# Patient Record
Sex: MALE | Race: WHITE
[De-identification: names, ages, dates, MRNs, and addresses within clinical notes are randomized per-mention and may not be internally consistent; named-entity substitution may affect disease eponyms.]

---

## 2017-08-21 ENCOUNTER — HOSPITAL ENCOUNTER (EMERGENCY)
Dept: HOSPITAL 12 - ER | Age: 82
Discharge: HOME | End: 2017-08-21
Payer: MEDICARE

## 2017-08-21 VITALS — DIASTOLIC BLOOD PRESSURE: 80 MMHG | SYSTOLIC BLOOD PRESSURE: 143 MMHG

## 2017-08-21 VITALS — BODY MASS INDEX: 27.48 KG/M2 | HEIGHT: 60 IN | WEIGHT: 140 LBS

## 2017-08-21 DIAGNOSIS — Z79.82: ICD-10-CM

## 2017-08-21 DIAGNOSIS — E78.5: ICD-10-CM
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
## 2017-08-21 DIAGNOSIS — F17.200: ICD-10-CM
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
## 2017-08-21 DIAGNOSIS — I10: ICD-10-CM

## 2017-08-21 DIAGNOSIS — S49.92XA: Primary | ICD-10-CM
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
## 2017-08-21 DIAGNOSIS — Z86.73: ICD-10-CM

## 2017-08-21 DIAGNOSIS — K21.9: ICD-10-CM

## 2017-08-21 DIAGNOSIS — I25.2: ICD-10-CM

## 2017-08-21 PROCEDURE — A4663 DIALYSIS BLOOD PRESSURE CUFF: HCPCS

## 2017-09-06 ENCOUNTER — HOSPITAL ENCOUNTER (OUTPATIENT)
Dept: HOSPITAL 12 - RAD | Age: 82
Discharge: HOME | End: 2017-09-06
Payer: MEDICARE

## 2017-09-06 DIAGNOSIS — I70.0: ICD-10-CM

## 2017-09-06 DIAGNOSIS — J98.11: Primary | ICD-10-CM

## 2017-11-14 ENCOUNTER — HOSPITAL ENCOUNTER (EMERGENCY)
Dept: HOSPITAL 12 - ER | Age: 82
Discharge: HOME | End: 2017-11-14
Payer: MEDICARE

## 2017-11-14 VITALS — DIASTOLIC BLOOD PRESSURE: 71 MMHG | SYSTOLIC BLOOD PRESSURE: 123 MMHG

## 2017-11-14 VITALS — HEIGHT: 64 IN | WEIGHT: 140 LBS | BODY MASS INDEX: 23.9 KG/M2

## 2017-11-14 DIAGNOSIS — Z95.5: ICD-10-CM
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
## 2017-11-14 DIAGNOSIS — K21.9: ICD-10-CM
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
## 2017-11-14 DIAGNOSIS — E78.5: ICD-10-CM

## 2017-11-14 DIAGNOSIS — S80.02XA: Primary | ICD-10-CM
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
## 2017-11-14 DIAGNOSIS — Z86.73: ICD-10-CM

## 2017-11-14 DIAGNOSIS — F17.200: ICD-10-CM

## 2017-11-14 DIAGNOSIS — I10: ICD-10-CM

## 2017-11-14 PROCEDURE — A4663 DIALYSIS BLOOD PRESSURE CUFF: HCPCS

## 2017-11-14 PROCEDURE — 73564 X-RAY EXAM KNEE 4 OR MORE: CPT

## 2017-11-14 PROCEDURE — 99284 EMERGENCY DEPT VISIT MOD MDM: CPT

## 2017-11-14 PROCEDURE — 29505 APPLICATION LONG LEG SPLINT: CPT

## 2017-11-14 NOTE — NUR
Patient discharged to home in stable conditon.  Written and verbal after care 
instructions given. 

Patient verbalizes understanding of instructions.pt using the walker easily, pt 
accompanied by daughter. pt says feels better.

## 2017-11-23 ENCOUNTER — HOSPITAL ENCOUNTER (EMERGENCY)
Dept: HOSPITAL 12 - ER | Age: 82
Discharge: HOME | End: 2017-11-23
Payer: MEDICARE

## 2017-11-23 VITALS — SYSTOLIC BLOOD PRESSURE: 144 MMHG | DIASTOLIC BLOOD PRESSURE: 79 MMHG

## 2017-11-23 VITALS — WEIGHT: 130 LBS | BODY MASS INDEX: 23.92 KG/M2 | HEIGHT: 62 IN

## 2017-11-23 DIAGNOSIS — Z79.02: ICD-10-CM
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
## 2017-11-23 DIAGNOSIS — E78.5: ICD-10-CM

## 2017-11-23 DIAGNOSIS — Z79.82: ICD-10-CM
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
## 2017-11-23 DIAGNOSIS — Z95.5: ICD-10-CM

## 2017-11-23 DIAGNOSIS — I10: ICD-10-CM

## 2017-11-23 DIAGNOSIS — Z86.73: ICD-10-CM

## 2017-11-23 DIAGNOSIS — K21.9: ICD-10-CM

## 2017-11-23 DIAGNOSIS — M25.462: Primary | ICD-10-CM
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
## 2017-11-23 DIAGNOSIS — F17.200: ICD-10-CM

## 2017-11-23 PROCEDURE — 73590 X-RAY EXAM OF LOWER LEG: CPT

## 2017-11-23 PROCEDURE — 99284 EMERGENCY DEPT VISIT MOD MDM: CPT

## 2017-11-23 PROCEDURE — 73564 X-RAY EXAM KNEE 4 OR MORE: CPT

## 2017-11-23 PROCEDURE — 20610 DRAIN/INJ JOINT/BURSA W/O US: CPT

## 2017-11-23 PROCEDURE — A4663 DIALYSIS BLOOD PRESSURE CUFF: HCPCS

## 2018-02-21 ENCOUNTER — HOSPITAL ENCOUNTER (INPATIENT)
Dept: HOSPITAL 12 - ER | Age: 83
LOS: 1 days | Discharge: ANOTHER HOSPITAL | DRG: 682 | End: 2018-02-22
Attending: INTERNAL MEDICINE | Admitting: INTERNAL MEDICINE
Payer: MEDICARE

## 2018-02-21 VITALS — DIASTOLIC BLOOD PRESSURE: 96 MMHG | SYSTOLIC BLOOD PRESSURE: 168 MMHG

## 2018-02-21 VITALS — WEIGHT: 129 LBS | HEIGHT: 64 IN | BODY MASS INDEX: 22.02 KG/M2

## 2018-02-21 VITALS — DIASTOLIC BLOOD PRESSURE: 69 MMHG | SYSTOLIC BLOOD PRESSURE: 154 MMHG

## 2018-02-21 DIAGNOSIS — G92: ICD-10-CM

## 2018-02-21 DIAGNOSIS — N17.0: Primary | ICD-10-CM

## 2018-02-21 DIAGNOSIS — D68.59: ICD-10-CM

## 2018-02-21 DIAGNOSIS — Z79.82: ICD-10-CM

## 2018-02-21 DIAGNOSIS — E83.52: ICD-10-CM

## 2018-02-21 DIAGNOSIS — S52.571A: ICD-10-CM

## 2018-02-21 DIAGNOSIS — H91.90: ICD-10-CM

## 2018-02-21 DIAGNOSIS — Z86.73: ICD-10-CM
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
## 2018-02-21 DIAGNOSIS — I25.2: ICD-10-CM

## 2018-02-21 DIAGNOSIS — M62.82: ICD-10-CM

## 2018-02-21 DIAGNOSIS — E11.65: ICD-10-CM

## 2018-02-21 DIAGNOSIS — F32.9: ICD-10-CM

## 2018-02-21 DIAGNOSIS — Z74.09: ICD-10-CM

## 2018-02-21 DIAGNOSIS — I13.0: ICD-10-CM

## 2018-02-21 DIAGNOSIS — E78.5: ICD-10-CM

## 2018-02-21 DIAGNOSIS — E87.0: ICD-10-CM

## 2018-02-21 DIAGNOSIS — K29.70: ICD-10-CM

## 2018-02-21 DIAGNOSIS — K21.0: ICD-10-CM

## 2018-02-21 DIAGNOSIS — I67.2: ICD-10-CM

## 2018-02-21 DIAGNOSIS — Z95.5: ICD-10-CM

## 2018-02-21 DIAGNOSIS — Z79.02: ICD-10-CM

## 2018-02-21 DIAGNOSIS — E86.0: ICD-10-CM

## 2018-02-21 DIAGNOSIS — N18.9: ICD-10-CM

## 2018-02-21 DIAGNOSIS — I50.33: ICD-10-CM
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
## 2018-02-21 DIAGNOSIS — I25.10: ICD-10-CM

## 2018-02-21 DIAGNOSIS — Z79.899: ICD-10-CM

## 2018-02-21 DIAGNOSIS — F01.50: ICD-10-CM

## 2018-02-21 DIAGNOSIS — I21.09: ICD-10-CM

## 2018-02-21 DIAGNOSIS — E11.22: ICD-10-CM

## 2018-02-21 LAB
ALP SERPL-CCNC: 93 U/L (ref 50–136)
ALT SERPL W/O P-5'-P-CCNC: 72 U/L (ref 16–63)
APAP SERPL-MCNC: < 2 UG/ML (ref 10–30)
AST SERPL-CCNC: 46 U/L (ref 15–37)
BASOPHILS # BLD AUTO: 0.1 K/UL (ref 0–8)
BASOPHILS NFR BLD AUTO: 0.5 % (ref 0–2)
BILIRUB DIRECT SERPL-MCNC: 0.5 MG/DL (ref 0–0.2)
BILIRUB SERPL-MCNC: 1.3 MG/DL (ref 0.2–1)
BUN SERPL-MCNC: 119 MG/DL (ref 7–18)
CHLORIDE SERPL-SCNC: 116 MMOL/L (ref 98–107)
CO2 SERPL-SCNC: 23 MMOL/L (ref 21–32)
CREAT SERPL-MCNC: 1.4 MG/DL (ref 0.6–1.3)
EOSINOPHIL # BLD AUTO: 0 K/UL (ref 0–0.7)
EOSINOPHIL NFR BLD AUTO: 0.1 % (ref 0–7)
GLUCOSE SERPL-MCNC: 330 MG/DL (ref 74–106)
HCT VFR BLD AUTO: 55.5 % (ref 36.7–47.1)
HEMOCCULT STL QL: NEGATIVE
HGB BLD-MCNC: 18.3 G/DL (ref 12.5–16.3)
LYMPHOCYTES # BLD AUTO: 1 K/UL (ref 20–40)
LYMPHOCYTES NFR BLD AUTO: 7.3 % (ref 20.5–51.5)
LYMPHOCYTES NFR BLD MANUAL: 10 % (ref 20–40)
MCH RBC QN AUTO: 29.6 UUG (ref 23.8–33.4)
MCHC RBC AUTO-ENTMCNC: 33 G/DL (ref 32.5–36.3)
MCV RBC AUTO: 89.7 FL (ref 73–96.2)
MONOCYTES # BLD AUTO: 1.1 K/UL (ref 2–10)
MONOCYTES NFR BLD AUTO: 8.3 % (ref 0–11)
MONOCYTES NFR BLD MANUAL: 7 % (ref 2–10)
NEUTROPHILS # BLD AUTO: 11.3 K/UL (ref 1.8–8.9)
NEUTROPHILS NFR BLD AUTO: 83.8 % (ref 38.5–71.5)
NEUTS BAND NFR BLD MANUAL: 3 % (ref 0–10)
NEUTS SEG NFR BLD MANUAL: 80 % (ref 42–75)
PLATELET # BLD AUTO: 326 K/UL (ref 152–348)
POTASSIUM SERPL-SCNC: 3.8 MMOL/L (ref 3.5–5.1)
RBC # BLD AUTO: 6.18 MIL/UL (ref 4.06–5.63)
TSH SERPL DL<=0.005 MIU/L-ACNC: 0.5 MIU/ML (ref 0.36–3.74)
WBC # BLD AUTO: 13.5 K/UL (ref 3.6–10.2)
WS STN SPEC: 8.1 G/DL (ref 6.4–8.2)

## 2018-02-21 PROCEDURE — 2W3CX1Z IMMOBILIZATION OF RIGHT LOWER ARM USING SPLINT: ICD-10-PCS | Performed by: EMERGENCY MEDICINE

## 2018-02-21 PROCEDURE — A4663 DIALYSIS BLOOD PRESSURE CUFF: HCPCS

## 2018-02-21 RX ADMIN — FAMOTIDINE SCH MG: 10 INJECTION INTRAVENOUS at 22:13

## 2018-02-21 RX ADMIN — SODIUM CHLORIDE PRN UNIT: 9 INJECTION, SOLUTION INTRAVENOUS at 22:20

## 2018-02-21 RX ADMIN — Medication SCH EACH: at 23:49

## 2018-02-21 RX ADMIN — Medication SCH EACH: at 22:18

## 2018-02-21 RX ADMIN — SODIUM CHLORIDE PRN UNIT: 9 INJECTION, SOLUTION INTRAVENOUS at 23:47

## 2018-02-21 NOTE — NUR
Received patient from ER. Patient is alert, in no distress. Pt is admitted to tele under the 
care of Dr. Up. Belonging list done, admission process and care plan initiated. Patient is 
on tele sinus tachy. Safety measures in place, bed alarm on. Will continue to monitor. Will 
call MD for new admission orders.

## 2018-02-21 NOTE — NUR
PT RECEIVED IN BED, ASLEEP. DAUGHTER AT BEDSIDE. WAKES TO NAME. A/OX1, CONFUSED. PT BP 
ELEVATED.  SINUS TACHY ON THE TELE MONITOR. MD AWARE, WAITING FOR MD ORDERS. IN STABLE 
CONDITION. PT SHOWS SIGNS OF PAIN SUCH AS FACIAL GRIMACING, GUARDING RIGHT HAND, 
RESTLESSNESS. IV INTACT AND PATENT. ON 2LNC, TOLERATING WELL. AFEBRILE. PT NPO AT THIS TIME. 
HOB ELEVATED. SAFETY MEASURES IMPLEMENTED. BED ALARM SET. CALL LIGHT WITHIN REACH.

## 2018-02-21 NOTE — NUR
MD notified regarding new admission orders, pending as of current time. Will endorse to 
oncoming shift RN.

## 2018-02-22 VITALS — DIASTOLIC BLOOD PRESSURE: 82 MMHG | SYSTOLIC BLOOD PRESSURE: 150 MMHG

## 2018-02-22 VITALS — DIASTOLIC BLOOD PRESSURE: 89 MMHG | SYSTOLIC BLOOD PRESSURE: 147 MMHG

## 2018-02-22 VITALS — DIASTOLIC BLOOD PRESSURE: 75 MMHG | SYSTOLIC BLOOD PRESSURE: 133 MMHG

## 2018-02-22 LAB
ALP SERPL-CCNC: 81 U/L (ref 50–136)
ALT SERPL W/O P-5'-P-CCNC: 76 U/L (ref 16–63)
AMPHETAMINES UR QL SCN>1000 NG/ML: NEGATIVE
APPEARANCE UR: CLEAR
AST SERPL-CCNC: 189 U/L (ref 15–37)
BARBITURATES UR QL SCN: NEGATIVE
BASOPHILS # BLD AUTO: 0 K/UL (ref 0–8)
BASOPHILS NFR BLD AUTO: 0.3 % (ref 0–2)
BILIRUB SERPL-MCNC: 1.1 MG/DL (ref 0.2–1)
BILIRUB UR QL STRIP: NEGATIVE
BUN SERPL-MCNC: 67 MG/DL (ref 7–18)
CHLORIDE SERPL-SCNC: 126 MMOL/L (ref 98–107)
CHOLEST SERPL-MCNC: 236 MG/DL (ref ?–200)
CO2 SERPL-SCNC: 28 MMOL/L (ref 21–32)
COCAINE UR QL SCN: NEGATIVE
COLOR UR: YELLOW
CREAT SERPL-MCNC: 1.1 MG/DL (ref 0.6–1.3)
CREAT UR-MCNC: 79.5 MG/DL (ref 30–125)
DEPRECATED SQUAMOUS URNS QL MICRO: (no result) /HPF
EOSINOPHIL # BLD AUTO: 0 K/UL (ref 0–0.7)
EOSINOPHIL NFR BLD AUTO: 0.4 % (ref 0–7)
GLUCOSE SERPL-MCNC: 160 MG/DL (ref 74–106)
GLUCOSE UR STRIP-MCNC: NEGATIVE MG/DL
HCT VFR BLD AUTO: 51.3 % (ref 36.7–47.1)
HDLC SERPL-MCNC: 34 MG/DL (ref 40–60)
HGB BLD-MCNC: 17 G/DL (ref 12.5–16.3)
HGB UR QL STRIP: (no result)
IRON SERPL-MCNC: 39 UG/DL (ref 50–175)
KETONES UR STRIP-MCNC: NEGATIVE MG/DL
LEUKOCYTE ESTERASE UR QL STRIP: NEGATIVE
LYMPHOCYTES # BLD AUTO: 0.9 K/UL (ref 20–40)
LYMPHOCYTES NFR BLD AUTO: 10.4 % (ref 20.5–51.5)
MAGNESIUM SERPL-MCNC: 2.8 MG/DL (ref 1.8–2.4)
MCH RBC QN AUTO: 29.8 UUG (ref 23.8–33.4)
MCHC RBC AUTO-ENTMCNC: 33 G/DL (ref 32.5–36.3)
MCV RBC AUTO: 89.7 FL (ref 73–96.2)
MONOCYTES # BLD AUTO: 1.1 K/UL (ref 2–10)
MONOCYTES NFR BLD AUTO: 12.1 % (ref 0–11)
MUCOUS THREADS URNS QL MICRO: (no result) /LPF
NEUTROPHILS # BLD AUTO: 6.8 K/UL (ref 1.8–8.9)
NEUTROPHILS NFR BLD AUTO: 76.8 % (ref 38.5–71.5)
NITRITE UR QL STRIP: NEGATIVE
OPIATES UR QL SCN: POSITIVE
PCP UR QL SCN>25 NG/ML: NEGATIVE
PH UR STRIP: 5.5 [PH] (ref 5–8)
PHOSPHATE SERPL-MCNC: 2.9 MG/DL (ref 2.5–4.9)
PLATELET # BLD AUTO: 222 K/UL (ref 152–348)
POTASSIUM SERPL-SCNC: 4.2 MMOL/L (ref 3.5–5.1)
PROT UR QL STRIP: (no result)
PROT UR-MCNC: 36.4 MG/DL
RBC # BLD AUTO: 5.72 MIL/UL (ref 4.06–5.63)
RBC #/AREA URNS HPF: (no result) /HPF (ref 0–3)
SP GR UR STRIP: 1.02 (ref 1–1.03)
THC UR QL SCN>50 NG/ML: NEGATIVE
TRIGL SERPL-MCNC: 220 MG/DL (ref 30–150)
TSH SERPL DL<=0.005 MIU/L-ACNC: 0.63 MIU/ML (ref 0.36–3.74)
UROBILINOGEN UR STRIP-MCNC: 1 E.U./DL
WBC # BLD AUTO: 8.8 K/UL (ref 3.6–10.2)
WBC #/AREA URNS HPF: (no result) /HPF
WBC #/AREA URNS HPF: (no result) /HPF (ref 0–3)
WS STN SPEC: 6.7 G/DL (ref 6.4–8.2)

## 2018-02-22 RX ADMIN — FAMOTIDINE SCH MG: 10 INJECTION INTRAVENOUS at 08:41

## 2018-02-22 RX ADMIN — Medication SCH EACH: at 05:46

## 2018-02-22 RX ADMIN — SODIUM CHLORIDE PRN UNIT: 9 INJECTION, SOLUTION INTRAVENOUS at 11:25

## 2018-02-22 RX ADMIN — Medication SCH EACH: at 11:21

## 2018-02-22 RX ADMIN — SODIUM CHLORIDE PRN UNIT: 9 INJECTION, SOLUTION INTRAVENOUS at 05:47

## 2018-02-22 NOTE — NUR
Report given to Harish PUCKETT for #[id number]. Heparin bolus given in the unit before patient's 
transfer. Heparin drip started by Carlton PUCKETT/[place] RN.

-------------------------------------------------------------------------------

Addendum: 02/22/18 at 1330 by AMI MONROE RN

-------------------------------------------------------------------------------

Add: ITALO Richter from [facility] Telemetry Unit [phone] (#[id number])

## 2018-02-22 NOTE — NUR
Patient transferred to [facility] per MD's order. Patient is alert, 
responsive, in no distress, no SOB, no c/o of discomfort or chest pain. VS stable, afebrile. 
Accompanied by daughter. Report given to [facility] RN. All transfer 
documents/paperwork provided to ambulance. Patient left with heparin drip, MD's order 
provided to transport RN. Consent of transfer signed by daughter.

## 2018-02-22 NOTE — NUR
END OF SHIFT NOTES. PT SLEPT WELL THROUGHOUT SHIFT. IN STABLE CONDITION. PT PLACED ON 2LNC, 
TOLERATING WELL. 97% O2 SATURATION. 107 SINUS TACHY ON THE TELE MONITOR. IVF INFUSING. PAIN 
MANAGED, PT FACIAL GRIMACING AND RESTLESSNESS CEASED. HOB ELEVATED. REMAINS NPO. ALL NEEDS 
ATTENDED. SAFETY MAINTAINED. CALL LIGHT WITHIN REACH.

## 2019-03-09 ENCOUNTER — HOSPITAL ENCOUNTER (EMERGENCY)
Dept: HOSPITAL 12 - ER | Age: 84
Discharge: HOME | End: 2019-03-09
Payer: MEDICARE

## 2019-03-09 VITALS — WEIGHT: 130 LBS | HEIGHT: 64 IN | BODY MASS INDEX: 22.2 KG/M2

## 2019-03-09 DIAGNOSIS — Z79.01: ICD-10-CM
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
## 2019-03-09 DIAGNOSIS — R07.89: ICD-10-CM

## 2019-03-09 DIAGNOSIS — Z79.82: ICD-10-CM

## 2019-03-09 DIAGNOSIS — Z86.73: ICD-10-CM

## 2019-03-09 DIAGNOSIS — I25.10: ICD-10-CM

## 2019-03-09 DIAGNOSIS — I25.2: ICD-10-CM
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
## 2019-03-09 DIAGNOSIS — I10: ICD-10-CM

## 2019-03-09 DIAGNOSIS — F17.200: ICD-10-CM

## 2019-03-09 DIAGNOSIS — E78.5: ICD-10-CM

## 2019-03-09 DIAGNOSIS — S30.1XXA: Primary | ICD-10-CM

## 2019-03-09 DIAGNOSIS — Z79.899: ICD-10-CM
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
## 2019-03-09 DIAGNOSIS — E11.9: ICD-10-CM

## 2019-03-09 DIAGNOSIS — K21.9: ICD-10-CM

## 2019-03-09 PROCEDURE — 96372 THER/PROPH/DIAG INJ SC/IM: CPT

## 2019-03-09 PROCEDURE — A4663 DIALYSIS BLOOD PRESSURE CUFF: HCPCS

## 2019-03-09 PROCEDURE — 99284 EMERGENCY DEPT VISIT MOD MDM: CPT

## 2019-03-09 PROCEDURE — 74176 CT ABD & PELVIS W/O CONTRAST: CPT

## 2019-03-09 PROCEDURE — 71250 CT THORAX DX C-: CPT

## 2019-03-09 NOTE — NUR
Patient's nurse-daughter is at bedside. Patient is resting comfortably on 
gurney with eyes closed, pending CT scans at this time.

## 2019-03-09 NOTE — NUR
Patient discharged to home in stable conditon & slow steady gait.  Written and 
verbal after care instructions given to patient's daughter. Patient's family 
verbalizes understanding of instructions.
